# Patient Record
Sex: MALE | Race: WHITE | NOT HISPANIC OR LATINO | Employment: OTHER | URBAN - METROPOLITAN AREA
[De-identification: names, ages, dates, MRNs, and addresses within clinical notes are randomized per-mention and may not be internally consistent; named-entity substitution may affect disease eponyms.]

---

## 2020-02-19 ENCOUNTER — OFFICE VISIT (OUTPATIENT)
Dept: ORTHOPEDICS | Facility: CLINIC | Age: 70
End: 2020-02-19
Attending: ORTHOPAEDIC SURGERY
Payer: MEDICARE

## 2020-02-19 DIAGNOSIS — S61.411A LACERATION OF RIGHT HAND WITHOUT FOREIGN BODY, INITIAL ENCOUNTER: ICD-10-CM

## 2020-02-19 PROCEDURE — 99203 OFFICE O/P NEW LOW 30 MIN: CPT | Mod: S$PBB,,, | Performed by: ORTHOPAEDIC SURGERY

## 2020-02-19 PROCEDURE — 99999 PR PBB SHADOW E&M-EST. PATIENT-LVL II: CPT | Mod: PBBFAC,,, | Performed by: ORTHOPAEDIC SURGERY

## 2020-02-19 PROCEDURE — 99212 OFFICE O/P EST SF 10 MIN: CPT | Mod: PBBFAC | Performed by: ORTHOPAEDIC SURGERY

## 2020-02-19 PROCEDURE — 99203 PR OFFICE/OUTPT VISIT, NEW, LEVL III, 30-44 MIN: ICD-10-PCS | Mod: S$PBB,,, | Performed by: ORTHOPAEDIC SURGERY

## 2020-02-19 PROCEDURE — 99999 PR PBB SHADOW E&M-EST. PATIENT-LVL II: ICD-10-PCS | Mod: PBBFAC,,, | Performed by: ORTHOPAEDIC SURGERY

## 2020-02-19 RX ORDER — NADOLOL 20 MG/1
20 TABLET ORAL DAILY
COMMUNITY

## 2020-02-19 RX ORDER — AMITRIPTYLINE HYDROCHLORIDE 25 MG/1
25 TABLET, FILM COATED ORAL NIGHTLY PRN
COMMUNITY

## 2020-02-19 RX ORDER — TRAMADOL HYDROCHLORIDE 50 MG/1
50 TABLET ORAL DAILY PRN
COMMUNITY

## 2020-02-19 RX ORDER — LOVASTATIN 20 MG/1
20 TABLET ORAL NIGHTLY
COMMUNITY

## 2020-02-19 RX ORDER — AMOXICILLIN AND CLAVULANATE POTASSIUM 500; 125 MG/1; MG/1
1 TABLET, FILM COATED ORAL 3 TIMES DAILY
COMMUNITY

## 2020-02-19 RX ORDER — METFORMIN HYDROCHLORIDE 500 MG/1
500 TABLET ORAL 2 TIMES DAILY WITH MEALS
COMMUNITY

## 2020-02-19 NOTE — PROGRESS NOTES
Subjective:      Patient ID: Garrett Hartman Sr. is a 69 y.o. male.    Chief Complaint: Pain and Injury of the Right Hand      HPI  Garrett Hartman Sr. is a  69 y.o. male presenting today for laceration of the right hand which he sustained about 2 days ago while using a .  There was a history of trauma.  Onset of symptoms began 2 days ago at night  He actually came from the [facility] for follow-up on this injury because there was some concern about the  injury itself  There was only water in the gun no chemicals or oral based products  The patient is actually doing quite well minimal pain is not taking any for pain and he started moving the fingers with only slight soreness.      Review of patient's allergies indicates:   Allergen Reactions    Codeine Itching         Current Outpatient Medications   Medication Sig Dispense Refill    amitriptyline (ELAVIL) 25 MG tablet Take 25 mg by mouth nightly as needed for Insomnia.      amoxicillin-clavulanate 500-125mg (AUGMENTIN) 500-125 mg Tab Take 1 tablet by mouth 3 (three) times daily.      lovastatin (MEVACOR) 20 MG tablet Take 20 mg by mouth every evening.      metFORMIN (GLUCOPHAGE) 500 MG tablet Take 500 mg by mouth 2 (two) times daily with meals.      nadolol (CORGARD) 20 MG tablet Take 20 mg by mouth once daily.      traMADol (ULTRAM) 50 mg tablet Take 50 mg by mouth daily as needed.       No current facility-administered medications for this visit.        No past medical history on file.    No past surgical history on file.    Review of Systems:  ROS    OBJECTIVE:     PHYSICAL EXAM:       Vitals:    02/19/20 1505   PainSc:   5     Well developed, well nourished male in no acute distress  Alert and oriented x 3  HEENT- Normal exam  Lungs- Clear to auscultation  Heart- Regular rate and rhythm  Abdomen- Soft nontender  Extremity exam- examination right hand dorsal laceration has sutures in place appears to be healing well there is some  bruising mild swelling and tenderness dorsally  No evidence of infection  Range of motion fingers particularly the middle finger slightly decreased secondary to some pain sensation intact    RADIOGRAPHS:  None  Comments: I have personally reviewed the imaging and I agree with the above radiologist's report.    ASSESSMENT/PLAN:   Pressure wash dorsal laceration right hand er laceration right hand dorsal  IMPRESSION:  As above    PLAN:  Fortunately this was just water and should resolve on its own  He is currently on antibiotics and I have recommended that he continue with this  He can start some gentle soap and water and range of motion exercises to prevent stiffness  No heavy lifting  Follow-up 1 week for suture removal       - We talked at length about the anatomy and pathophysiology of   Encounter Diagnosis   Name Primary?    Laceration of right hand without foreign body, initial encounter            Disclaimer: This note has been generated using voice-recognition software. There may be typographical errors that have been missed during proof-reading.

## 2025-05-02 DIAGNOSIS — K74.60 HEPATIC CIRRHOSIS, UNSPECIFIED HEPATIC CIRRHOSIS TYPE: Primary | ICD-10-CM

## 2025-05-02 DIAGNOSIS — I86.4 ESOPHAGEAL AND GASTRIC VARICES: ICD-10-CM

## 2025-05-02 DIAGNOSIS — I85.00 ESOPHAGEAL AND GASTRIC VARICES: ICD-10-CM

## 2025-05-02 DIAGNOSIS — I86.4 GASTRIC VARICES: ICD-10-CM

## 2025-07-31 ENCOUNTER — OFFICE VISIT (OUTPATIENT)
Dept: HEPATOLOGY | Facility: CLINIC | Age: 75
End: 2025-07-31
Payer: MEDICARE

## 2025-07-31 VITALS
DIASTOLIC BLOOD PRESSURE: 58 MMHG | OXYGEN SATURATION: 99 % | RESPIRATION RATE: 18 BRPM | SYSTOLIC BLOOD PRESSURE: 119 MMHG | TEMPERATURE: 98 F | WEIGHT: 218.94 LBS | HEART RATE: 52 BPM

## 2025-07-31 DIAGNOSIS — Z98.890 HISTORY OF ABDOMINAL PARACENTESIS: ICD-10-CM

## 2025-07-31 DIAGNOSIS — K72.90 DECOMPENSATED LIVER DISEASE: ICD-10-CM

## 2025-07-31 DIAGNOSIS — I85.11 SECONDARY ESOPHAGEAL VARICES WITH BLEEDING: ICD-10-CM

## 2025-07-31 DIAGNOSIS — R18.8 ASCITES OF LIVER: ICD-10-CM

## 2025-07-31 DIAGNOSIS — I86.4 ESOPHAGEAL AND GASTRIC VARICES: ICD-10-CM

## 2025-07-31 DIAGNOSIS — I85.00 ESOPHAGEAL AND GASTRIC VARICES: ICD-10-CM

## 2025-07-31 DIAGNOSIS — K76.82 HEPATIC ENCEPHALOPATHY: ICD-10-CM

## 2025-07-31 DIAGNOSIS — K75.81 METABOLIC DYSFUNCTION-ASSOCIATED STEATOHEPATITIS (MASH): ICD-10-CM

## 2025-07-31 DIAGNOSIS — K76.6 PORTAL HYPERTENSION: ICD-10-CM

## 2025-07-31 DIAGNOSIS — K74.60 DECOMPENSATED LIVER DISEASE: ICD-10-CM

## 2025-07-31 PROCEDURE — 99999 PR PBB SHADOW E&M-EST. PATIENT-LVL IV: CPT | Mod: PBBFAC,,, | Performed by: INTERNAL MEDICINE

## 2025-07-31 PROCEDURE — 99214 OFFICE O/P EST MOD 30 MIN: CPT | Mod: PBBFAC | Performed by: INTERNAL MEDICINE

## 2025-07-31 RX ORDER — IRON,CARBONYL/ASCORBIC ACID 100-250 MG
1 TABLET ORAL 2 TIMES DAILY
COMMUNITY

## 2025-07-31 RX ORDER — MEGESTROL ACETATE 20 MG/1
TABLET ORAL
COMMUNITY

## 2025-07-31 RX ORDER — INSULIN GLARGINE 100 [IU]/ML
36 INJECTION, SOLUTION SUBCUTANEOUS
COMMUNITY
Start: 2025-05-28

## 2025-07-31 RX ORDER — PANCRELIPASE 36000; 180000; 114000 [USP'U]/1; [USP'U]/1; [USP'U]/1
CAPSULE, DELAYED RELEASE PELLETS ORAL
COMMUNITY
Start: 2024-09-17

## 2025-07-31 RX ORDER — AMILORIDE HYDROCHLORIDE 5 MG/1
10 TABLET ORAL DAILY
COMMUNITY
Start: 2025-07-10

## 2025-07-31 RX ORDER — PANTOPRAZOLE SODIUM 40 MG/1
40 TABLET, DELAYED RELEASE ORAL 2 TIMES DAILY
COMMUNITY

## 2025-07-31 RX ORDER — CARVEDILOL 3.12 MG/1
3.12 TABLET ORAL 2 TIMES DAILY WITH MEALS
Qty: 180 TABLET | Refills: 3 | Status: SHIPPED | OUTPATIENT
Start: 2025-07-31 | End: 2026-07-31

## 2025-07-31 RX ORDER — INSULIN ASPART INJECTION 100 [IU]/ML
INJECTION, SOLUTION SUBCUTANEOUS
COMMUNITY
Start: 2025-03-25

## 2025-07-31 NOTE — PROGRESS NOTES
Hepatology Consult Note    Referring provider: Dr. Nacho Ramirez  PCP: No, Primary Doctor    Chief complaint: gastric varices    HPI:  Garrett Hartman Sr. is a 74 y.o. 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 Accompanied by daughter-in-law, who helps provide HPI.      First diagnosed with MASH cirrhosis before COVID after having labs, and imaging. Denies prior liver biopsy.     Regarding decompensating events, he had prior bleeding from esophageal varices treated with banding in Sept 2023. Per chart review, patient underwent EGD in April 2025, which demonstrated large EV, large GOV2. He is taking nadolol. Has been diagnosed with hepatic encephalopathy for which he takes rifaximin, but off lactulose as it causes hyperglycemia, profuse diarrhea. Reports prior diagnosis of ascites with prior paracentesis. He is taking Lasix 40mg qd, Amiloride 10mg qd. Denies prior episodes of jaundice    Regarding risk factors, the patient denies prior history of EtOH abuse, illicit drug use, blood transfusions, prior tattoos. MASLD risk factors: obesity, DM type 2, HTN, HLD, DANIELA. Denies HTN. Denies family history of liver disease or liver cancer.     Abdominal surgeries include partial colectomy from diverticulitis. Denies upper abdominal surgeries.    No past medical history on file.    No past surgical history on file.    No family history on file.    Social History[1]    Current Medications[2]    Review of patient's allergies indicates:   Allergen Reactions    Codeine Itching    Trazodone Other (See Comments)     Excess sedation            Vitals:    07/31/25 1121   BP: (!) 119/58   Pulse: (!) 52   Resp: 18   Temp: 98 °F (36.7 °C)   TempSrc: Oral   SpO2: 99%   Weight: 99.3 kg (218 lb 14.7 oz)   There is no height or weight on file to calculate BMI.    Physical  "Exam  Constitutional:       General: He is not in acute distress.  Eyes:      General: No scleral icterus.  Cardiovascular:      Rate and Rhythm: Bradycardia present.   Pulmonary:      Effort: Pulmonary effort is normal.      Breath sounds: Normal breath sounds.   Abdominal:      General: Abdomen is protuberant. There is no distension.      Palpations: Abdomen is soft. There is no fluid wave, hepatomegaly or splenomegaly.      Tenderness: There is no abdominal tenderness.   Musculoskeletal:      Right lower leg: No edema.      Left lower leg: No edema.   Skin:     General: Skin is warm.   Neurological:      General: No focal deficit present.      Mental Status: He is alert and oriented to person, place, and time.      Comments: No asterixis.   Psychiatric:         Behavior: Behavior is cooperative.         Thought Content: Thought content normal.         LABS: I personally reviewed pertinent laboratory findings.    Lab Results   Component Value Date    WBC 3.4 (L) 07/02/2025    HGB 10 (L) 07/02/2025    HCT 30.6 (L) 07/02/2025    .4 (H) 07/02/2025    PLT 57 (L) 07/02/2025       Lab Results   Component Value Date     10/04/2022    K 4.3 10/04/2022     10/04/2022    CO2 30 10/04/2022    BUN 12 10/04/2022    CREATININE 1.07 10/04/2022    CALCIUM 9.5 10/04/2022    ESTGFRAFRICA 80 (L) 10/04/2022    EGFRNONAA 69 (L) 10/04/2022       Lab Results   Component Value Date    ALT 20 06/16/2025    AST 25 06/16/2025    ALKPHOS 153 (H) 06/16/2025       No results found for: "HAV", "HEPAIGM", "HEPBIGM", "HEPBCAB", "HBEAG", "HEPCAB"    No results found for: "ADE", "MITOAB", "SMOOTHMUSCAB", "IGG", "CERULOPLSM"     Computed MELD 3.0 unavailable. One or more values for this score either were not found within the given timeframe or did not fit some other criterion.  Computed MELD-Na unavailable. One or more values for this score either were not found within the given timeframe or did not fit some other " criterion.    IMAGING: I personally reviewed imaging studies.       EGD 4/30/25 (media tab)      Assessment:  Garrett Hartman Sr. is a 74 y.o. 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     1. Decompensated liver disease    2. Metabolic dysfunction-associated steatohepatitis (MASH)    3. Portal hypertension    4. Secondary esophageal varices with bleeding    5. Esophageal and gastric varices    6. Hepatic encephalopathy    7. Ascites of liver    8. History of abdominal paracentesis      #Decompensated MASH Cirrhosis     Volume: Well-controlled. Ascites with prior paracentesis. He is taking Lasix 40mg qd, Amiloride 10mg qd. Na 2g diet.  Infection: No concern at this time.   Bleeding: History of EVB 9/2023. Last EGD with large EV, GOV2. Would not recommend preemptive treatment of gastric varices. If bleeding GOV2, then could consider endoscopic intervention vs TIPS. Recommend switching nadolol to carvedilol. Start carvedilol 3.125mg BID. Stop nadolol.  Encephalopathy: Well controlled on current regimen. Continue rifaximin 550mg BID.  Screening: Last AFP 2.2 (5/28/25). Last liver US w/o HCC (7/18/25).  Immunization: Recommend HAV, HBV vaccination if not immune.  Transplant: MELD 3.0 ~16 with last set of labs in June 2025. Advanced age is a barrier to transplant. Discussed this with patient.     Patient will continue to follow with local GI. Return to clinic as needed.    I have sent communication to the referring physician and/or primary care provider.    Tamra Reaves MD  Staff Physician  Hepatology and Liver Transplant  [facility]  [facility]           [1]   Social History  Tobacco Use    Smoking status: Former     Types: Cigarettes    Smokeless tobacco: Never   [2]   Current Outpatient Medications    Medication Sig Dispense Refill    aMILoride (MIDAMOR) 5 MG Tab Take 10 mg by mouth once daily.      amitriptyline (ELAVIL) 25 MG tablet Take 25 mg by mouth nightly as needed for Insomnia.      amoxicillin-clavulanate 500-125mg (AUGMENTIN) 500-125 mg Tab Take 1 tablet by mouth 3 (three) times daily.      FIASP FLEXTOUCH U-100 INSULIN 100 unit/mL (3 mL) InPn SMARTSI-50 Unit(s) SUB-Q 3 Times Daily      insulin glargine U-100, Lantus, (BASAGLAR KWIKPEN U-100 INSULIN) 100 unit/mL (3 mL) InPn pen Inject 36 Units into the skin.      iron-vitamin C 100-250 mg, ICAR-C, 100-250 mg Tab Take 1 tablet by mouth 2 (two) times daily.      lipase-protease-amylase (CREON) 36,000-114,000- 180,000 unit CpDR Take by mouth.      lovastatin (MEVACOR) 20 MG tablet Take 20 mg by mouth every evening.      megestroL (MEGACE) 20 MG Tab SMARTSI Tablet(s) By Mouth Morning-Night      metFORMIN (GLUCOPHAGE) 500 MG tablet Take 500 mg by mouth 2 (two) times daily with meals.      pantoprazole (PROTONIX) 40 MG tablet Take 40 mg by mouth 2 (two) times daily.      rifAXIMin (XIFAXAN) 550 mg Tab TAKE ONE TABLET BY MOUTH EVERY MORNING AND TAKE ONE TABLET BY MOUTH EVERY NIGHT AT BEDTIME      traMADol (ULTRAM) 50 mg tablet Take 50 mg by mouth daily as needed.      carvediloL (COREG) 3.125 MG tablet Take 1 tablet (3.125 mg total) by mouth 2 (two) times daily with meals. 180 tablet 3     No current facility-administered medications for this visit.